# Patient Record
Sex: MALE | Race: WHITE | Employment: OTHER | URBAN - METROPOLITAN AREA
[De-identification: names, ages, dates, MRNs, and addresses within clinical notes are randomized per-mention and may not be internally consistent; named-entity substitution may affect disease eponyms.]

---

## 2017-05-11 ENCOUNTER — HOSPITAL ENCOUNTER (OUTPATIENT)
Dept: GENERAL RADIOLOGY | Age: 82
Discharge: HOME | End: 2017-05-11

## 2017-05-11 LAB
ALBUMIN SERPL-MCNC: 4.7 G/DL (ref 3.4–5)
ALP BLD-CCNC: 107 U/L (ref 40–129)
ALT SERPL-CCNC: 6 U/L (ref 10–40)
ANION GAP SERPL CALCULATED.3IONS-SCNC: 15 MMOL/L (ref 3–16)
AST SERPL-CCNC: 15 U/L (ref 15–37)
BILIRUB SERPL-MCNC: 1 MG/DL (ref 0–1)
BILIRUBIN DIRECT: <0.2 MG/DL (ref 0–0.3)
BILIRUBIN, INDIRECT: ABNORMAL MG/DL (ref 0–1)
BUN BLDV-MCNC: 16 MG/DL (ref 7–20)
CALCIUM SERPL-MCNC: 9.4 MG/DL (ref 8.3–10.6)
CHLORIDE BLD-SCNC: 102 MMOL/L (ref 99–110)
CHOLESTEROL, TOTAL: 134 MG/DL (ref 0–199)
CO2: 26 MMOL/L (ref 21–32)
CREAT SERPL-MCNC: 0.7 MG/DL (ref 0.8–1.3)
GFR AFRICAN AMERICAN: >60
GFR NON-AFRICAN AMERICAN: >60
GLUCOSE BLD-MCNC: 94 MG/DL (ref 70–99)
HDLC SERPL-MCNC: 36 MG/DL (ref 40–60)
LDL CHOLESTEROL CALCULATED: 81 MG/DL
POTASSIUM SERPL-SCNC: 5 MMOL/L (ref 3.5–5.1)
SODIUM BLD-SCNC: 143 MMOL/L (ref 136–145)
TOTAL PROTEIN: 7.5 G/DL (ref 6.4–8.2)
TRIGL SERPL-MCNC: 87 MG/DL (ref 0–150)
VLDLC SERPL CALC-MCNC: 17 MG/DL

## 2020-09-18 ENCOUNTER — APPOINTMENT (OUTPATIENT)
Dept: CT IMAGING | Age: 85
End: 2020-09-18
Payer: MEDICARE

## 2020-09-18 ENCOUNTER — APPOINTMENT (OUTPATIENT)
Dept: GENERAL RADIOLOGY | Age: 85
End: 2020-09-18
Payer: MEDICARE

## 2020-09-18 ENCOUNTER — HOSPITAL ENCOUNTER (EMERGENCY)
Age: 85
Discharge: HOME | End: 2020-09-19
Attending: EMERGENCY MEDICINE
Payer: MEDICARE

## 2020-09-18 VITALS
WEIGHT: 200 LBS | DIASTOLIC BLOOD PRESSURE: 72 MMHG | RESPIRATION RATE: 14 BRPM | BODY MASS INDEX: 28.63 KG/M2 | SYSTOLIC BLOOD PRESSURE: 153 MMHG | TEMPERATURE: 98 F | HEART RATE: 96 BPM | OXYGEN SATURATION: 98 %

## 2020-09-18 PROCEDURE — 70450 CT HEAD/BRAIN W/O DYE: CPT

## 2020-09-18 PROCEDURE — 73130 X-RAY EXAM OF HAND: CPT

## 2020-09-18 PROCEDURE — 99284 EMERGENCY DEPT VISIT MOD MDM: CPT

## 2020-09-19 NOTE — ED PROVIDER NOTES
[facility]  ED  EMERGENCYDEPARTMENT ENCOUNTER      Pt Name: Chris Almaraz  MRN: [id number]  [place] [id number] evaluation: 9/18/2020  Provider:Lam Toth MD    CHIEF COMPLAINT       Chief Complaint   Patient presents with   Tressia Pulse     states fell off tractor a couple hours ago, L wrist deformity and L hip pain, denies LOC, takes plavix          HISTORY OF PRESENT ILLNESS    Chris Almaraz is a 80 y.o. male who presents to the emergency department with fall. Patient was getting out of his tractor, fell to ground landing on his left wrist.  Unsure if he lost consciousness but he remembers his vision coming back slowly. Having left knee pain, left wrist pain. Able to ambulate normally, although he says that he is poor with ambulation in general.  Patient says that he then got on his tractor and continue to work. Pain is moderate in severity, dull, worse on the ulnar side of the wrist.  Nothing taken for pain. Patient is on blood thinners. Nursing Notes were reviewed. REVIEW OF SYSTEMS       Review of Systems    10 point review of systems was performed and was negative exceptas specifically noted in the HPI. PAST MEDICAL HISTORY   History reviewed. No pertinent past medical history. SURGICAL HISTORY       Past Surgical History:   Procedure Laterality Date    ARM SURGERY      CARDIAC SURGERY           CURRENT MEDICATIONS       Previous Medications    AMLODIPINE (NORVASC) 10 MG TABLET    Take 10 mg by mouth daily. ASPIRIN 81 MG TABLET    Take 81 mg by mouth daily. BENAZEPRIL (LOTENSIN) 20 MG TABLET    Take 20 mg by mouth daily. CLOPIDOGREL (PLAVIX) 75 MG TABLET    Take 75 mg by mouth daily. MELOXICAM (MOBIC) 15 MG TABLET    Take 1 tablet by mouth daily    ROSUVASTATIN (CRESTOR) 20 MG TABLET    Take 20 mg by mouth daily. ALLERGIES     Patient has no known allergies. FAMILY HISTORY     History reviewed. No pertinent family history.        SOCIAL HISTORY       Social History     Socioeconomic History    Marital status:      Spouse name: None    Number of children: None    Years of education: None    Highest education level: None   Occupational History    None   Social Needs    Financial resource strain: None    Food insecurity     Worry: None     Inability: None    Transportation needs     Medical: None     Non-medical: None   Tobacco Use    Smoking status: Never Smoker   Substance and Sexual Activity    Alcohol use: None    Drug use: None    Sexual activity: None   Lifestyle    Physical activity     Days per week: None     Minutes per session: None    Stress: None   Relationships    Social connections     Talks on phone: None     Gets together: None     Attends [employer]od[employer] service: None     Active member of club or organization: None     Attends meetings of clubs or organizations: None     Relationship status: None    Intimate partner violence     Fear of current or ex partner: None     Emotionally abused: None     Physically abused: None     Forced sexual activity: None   Other Topics Concern    None   Social History Narrative    None       SCREENINGS    [place] Coma Scale  Eye Opening: Spontaneous  Best Verbal Response: Oriented  Best Motor Response: Obeys commands  [place] Coma Scale Score: 15        PHYSICAL EXAM       ED Triage Vitals   BP Temp Temp Source Pulse Resp SpO2 Height Weight   09/18/20 2033 09/18/20 2034 09/18/20 2034 09/18/20 2033 09/18/20 2033 09/18/20 2033 -- 09/18/20 2033   (!) 159/82 98 °F (36.7 °C) Oral 98 18 97 %  200 lb (90.7 kg)       Physical Exam  General appearance: Alert, cooperative, no distress, appears stated age. Head:  Normocephalic, without obvious abnormality, atraumatic.   HEENT: Mucous membranes moist.  Neck: Full ROM, trachea midline, no JVD  Lungs: No respiratory distress  Cardiovasular: Perfusing extremities  Abdomen: Nontender, no guarding  Extremities: Swelling and tenderness to palpation diffusely of the left wrist.  There is a large associated hematoma most notable on the dorsal surface of the hand extending to the ulna, but there is diffuse tenderness. There is 2+ radial pulse. Patient able to move all fingers. Capillary refill is less than 2 seconds. On the left knee there is a mild swelling but easy range of motion, same with the left hip. Skin: No rashes or lesions to exposed skin  Neurologic: Alert and oriented x3, motor grossly normal, clear speech    DIAGNOSTIC RESULTS     EKG:     IMAGING:   Non-plain film images such as CT, Ultrasound and MRI are read by the radiologist.Plain radiographic images are visualized and preliminarily interpreted by the emergency physician with the below findings:    Ulnar styloid fracture and intra-articular radial fracture. Interpretation per the Radiologist below, if available at the time of this note:    CT HEAD WO CONTRAST   Final Result   No acute intracranial abnormality on this motion limited exam.      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 XR HAND LEFT (MIN 3 VIEWS)   Final Result   Comminuted, intra-articular impacted distal left radial fracture. Ulnar   styloid fracture. EDBEDSIDE ULTRASOUND:   Performed by Thang Dietz - none    LABS:  Labs Reviewed - No data to display    All other labs were within normal range or not returned as of this dictation. EMERGENCY DEPARTMENT COURSE and DIFFERENTIAL DIAGNOSIS/MDM:   Vitals:    Vitals:    09/18/20 2033 09/18/20 2034 09/18/20 2224   BP: (!) 159/82  (!) 153/72   Pulse: 98  96   Resp: 18  14   Temp:  98 °F (36.7 °C)    TempSrc:  Oral    SpO2: 97%  98%   Weight: 200 lb (90.7 kg)         Medications - No data to display    MDM     Patient presents with fall. Vital signs are stable.   Examination shows swelling of the left wrist with large associated hematoma consistent with the patient's history of using

## 2020-09-19 NOTE — ED NOTES
Fall precautions in place for pt. Bed alarm, fall band in place. Pt declines fall socks.      Bryn Ambriz RN  09/18/20 0550

## 2020-09-22 ENCOUNTER — OFFICE VISIT (OUTPATIENT)
Dept: ORTHOPEDIC SURGERY | Age: 85
End: 2020-09-22
Payer: MEDICARE

## 2020-09-22 VITALS — WEIGHT: 200 LBS | BODY MASS INDEX: 28.63 KG/M2 | RESPIRATION RATE: 17 BRPM | HEIGHT: 70 IN

## 2020-09-22 PROBLEM — S52.532A CLOSED COLLES' FRACTURE OF LEFT RADIUS: Status: ACTIVE | Noted: 2020-09-22

## 2020-09-22 PROCEDURE — 4040F PNEUMOC VAC/ADMIN/RCVD: CPT | Performed by: ORTHOPAEDIC SURGERY

## 2020-09-22 PROCEDURE — 1123F ACP DISCUSS/DSCN MKR DOCD: CPT | Performed by: ORTHOPAEDIC SURGERY

## 2020-09-22 PROCEDURE — G8427 DOCREV CUR MEDS BY ELIG CLIN: HCPCS | Performed by: ORTHOPAEDIC SURGERY

## 2020-09-22 PROCEDURE — 99203 OFFICE O/P NEW LOW 30 MIN: CPT | Performed by: ORTHOPAEDIC SURGERY

## 2020-09-22 PROCEDURE — G8417 CALC BMI ABV UP PARAM F/U: HCPCS | Performed by: ORTHOPAEDIC SURGERY

## 2020-09-22 PROCEDURE — 29075 APPL CST ELBW FNGR SHORT ARM: CPT | Performed by: ORTHOPAEDIC SURGERY

## 2020-09-22 PROCEDURE — 4004F PT TOBACCO SCREEN RCVD TLK: CPT | Performed by: ORTHOPAEDIC SURGERY

## 2020-09-22 NOTE — PROGRESS NOTES
Chief Complaint  Wrist Pain (NP LT WRIST FX DOI: 9/18)      History of Present Illness:  Kevin Leyva is a 80 y.o. male. He presents today for a new hand surgery specialty evaluation regarding his left wrist.  I have not seen him in probably 15 years ago when we did internal fixation of his right wrist and forearm. He reports that he was riding his tractor on 9/18/2020 and when he got off the tractor his foot got caught and he landed on an outstretched left wrist.  He did go back and finish his yard but continued to have soreness in the wrist.  Subsequent emergency room visit demonstrated a distal radius fracture with some impaction. Medical History:  Patient's medications, allergies, past medical, surgical, social and family histories were reviewed and updated as appropriate. Review of Systems  Pertinent items are noted in HPI  Denies fever, chills, confusion, bowel/bladder active change. Review of systems reviewed from Patient History Form dated on 9/22/20 and available in the patient's chart under the Media tab. Vital Signs  Vitals:    09/22/20 0754   Resp: 17       General Exam:   Constitutional: Patient is adequately groomed with no evidence of malnutrition  Mental Status: The patient is oriented to time, place and person. The patient's mood and affect are appropriate. Wrist Examination    Inspection: There is some moderate swelling but no sign of open laceration. There may be some fullness over the dorsum of the wrist but the overall resting alignment is satisfactory. Palpation: There is tenderness both when I palpate over the distal radius but also the ulnar styloid. There is no pain at the elbow or into the fingers.     Range of Motion: There is satisfactory flexion and extension of fingers and thumb although there is some finger stiffness in general consistent with his distal edema    Strength: No focal weakness noted    Special Tests: Hand and forearm compartments remain soft    Skin: There are no additional worrisome rashes, ulcerations or lesions. Sensation: normal    Circulation normal    Additional Comments:     Additional Examinations:  Right Upper Extremity:  Examination of the right upper extremity does not show any tenderness, deformity or injury. Range of motion is unremarkable. There is no gross instability. There are no rashes, ulcerations or lesions. Strength and tone are normal.    Radiology:     X-rays obtained at the emergency room and reviewed in office:  Views 3  Location left wrist  Impression x-rays demonstrate left distal radius fracture with some comminution and slight intra-articular extension, with slight shortening and dorsal angulation. There is also a minimally displaced ulnar styloid fracture    Diagnostic Test Findings:        Assessment: Left distal radius fracture    Impression:  Encounter Diagnosis   Name Primary?  Closed Colles' fracture of left radius, initial encounter        Office Procedures:  Orders Placed This Encounter   Procedures    MS CAST SUP SHT ARM ADULT FBRGL    MS APPLY FOREARM CAST       Treatment Plan: I discussed with the patient and his spouse that this fracture needs to be immobilized but I do believe that we can manage him with conservative care. Even with some impaction and dorsal angulation I expect him to have an excellent long-term functional outcome. He does understand he may have some residual rounding of the wrist.    Today we will place him into a well molded short arm fiberglass cast but with some slight palmar flexion to account for the impaction and dorsal angulation. He will work diligently on range of motion of fingers and thumb. I would like to see him back in just 1 week's time to check the repeat x-rays and ensure that we can continue to manage him with conservative care for a minimum immobilization of 6 weeks. Please note that this transcription was created using voice recognition software. Any errors are unintentional and may be due to voice recognition transcription.

## 2020-09-29 ENCOUNTER — OFFICE VISIT (OUTPATIENT)
Dept: ORTHOPEDIC SURGERY | Age: 85
End: 2020-09-29
Payer: MEDICARE

## 2020-09-29 VITALS — BODY MASS INDEX: 28.63 KG/M2 | WEIGHT: 200 LBS | HEIGHT: 70 IN

## 2020-09-29 PROCEDURE — 1123F ACP DISCUSS/DSCN MKR DOCD: CPT | Performed by: ORTHOPAEDIC SURGERY

## 2020-09-29 PROCEDURE — 4040F PNEUMOC VAC/ADMIN/RCVD: CPT | Performed by: ORTHOPAEDIC SURGERY

## 2020-09-29 PROCEDURE — 99213 OFFICE O/P EST LOW 20 MIN: CPT | Performed by: ORTHOPAEDIC SURGERY

## 2020-09-29 PROCEDURE — G8427 DOCREV CUR MEDS BY ELIG CLIN: HCPCS | Performed by: ORTHOPAEDIC SURGERY

## 2020-09-29 PROCEDURE — G8417 CALC BMI ABV UP PARAM F/U: HCPCS | Performed by: ORTHOPAEDIC SURGERY

## 2020-09-29 PROCEDURE — 1036F TOBACCO NON-USER: CPT | Performed by: ORTHOPAEDIC SURGERY

## 2020-09-29 NOTE — PROGRESS NOTES
Chief Complaint:  Wrist Pain (LT WRIST FX DOI: 9/18)      History of Present of Illness: The patient returns for close follow-up and reports that he has been working on simple range of motion of the fingers without any other perceived setback. He does feel some generalized stiffness into the thumb and digits. Review of Systems  Pertinent items are noted in HPI  Denies fever, chills, confusion, bowel/bladder active change. Review of systems reviewed from Patient History Form dated on 9/22/2020 and available in the patient's chart under the Media tab. Examination:  On exam today the cast remains in good position and is fitting well. There is no excessive looseness or tightness. He does demonstrate good flexion and extension with subjective stiffness. There is no sign of any loss of perfusion or sensation. Radiology:     X-rays obtained and reviewed in office:  Views 3  Location left wrist  Impression x-rays demonstrate overall maintained position of the slightly impacted left distal radius fracture which does have approximately 3 to 4 degrees of dorsal tilt on lateral view. There is no dramatic change from prior x-rays. Orders Placed This Encounter   Procedures    XR WRIST LEFT (MIN 3 VIEWS)     Standing Status:   Future     Number of Occurrences:   1     Standing Expiration Date:   9/23/2021       Impression:  Encounter Diagnosis   Name Primary?  Closed Colles' fracture of left radius with routine healing, subsequent encounter Yes         Treatment Plan:  I believe we can continue to treat him with conservative cast immobilization.   I would like to see him back in just another week's time as I do believe at that juncture we will plan to take new x-rays but may need to consider changing his cast if there is further reduction of swelling that will necessitate a new well molded fitted cast.  They do understand I will recommend 6 full weeks of immobilization before transitioning to a removable

## 2020-10-08 ENCOUNTER — OFFICE VISIT (OUTPATIENT)
Dept: ORTHOPEDIC SURGERY | Age: 85
End: 2020-10-08
Payer: MEDICARE

## 2020-10-08 VITALS — WEIGHT: 200 LBS | HEIGHT: 70 IN | BODY MASS INDEX: 28.63 KG/M2

## 2020-10-08 PROCEDURE — G8484 FLU IMMUNIZE NO ADMIN: HCPCS | Performed by: ORTHOPAEDIC SURGERY

## 2020-10-08 PROCEDURE — 99213 OFFICE O/P EST LOW 20 MIN: CPT | Performed by: ORTHOPAEDIC SURGERY

## 2020-10-08 PROCEDURE — G8417 CALC BMI ABV UP PARAM F/U: HCPCS | Performed by: ORTHOPAEDIC SURGERY

## 2020-10-08 PROCEDURE — 4040F PNEUMOC VAC/ADMIN/RCVD: CPT | Performed by: ORTHOPAEDIC SURGERY

## 2020-10-08 PROCEDURE — 1123F ACP DISCUSS/DSCN MKR DOCD: CPT | Performed by: ORTHOPAEDIC SURGERY

## 2020-10-08 PROCEDURE — G8427 DOCREV CUR MEDS BY ELIG CLIN: HCPCS | Performed by: ORTHOPAEDIC SURGERY

## 2020-10-08 PROCEDURE — 1036F TOBACCO NON-USER: CPT | Performed by: ORTHOPAEDIC SURGERY

## 2020-10-08 NOTE — PROGRESS NOTES
Chief Complaint:  Wrist Injury (LT WRIST FX DOI: 9/18)      History of Present of Illness: The patient returns for close follow-up and reports that the cast is started to give him some discomfort near the distal ulna. He believes that the swelling may have come down since her last visit. He is now about 3 weeks and from the initial injury with his distal radius fracture. Review of Systems  Pertinent items are noted in HPI  Denies fever, chills, confusion, bowel/bladder active change. Review of systems reviewed from Patient History Form dated on 9/22/2020 and available in the patient's chart under the Media tab. Examination:  On exam today indeed the cast is becoming slightly loose. He describes discomfort over the distal ulna region. He has satisfactory range of motion of fingers and thumb with good perfusion and good sensation. Radiology:     X-rays obtained and reviewed in office:  Views 3  Location left wrist  Impression x-rays demonstrates overall satisfactory alignment with respect to the radius on the AP view, but on the lateral view there is still very slight dorsal tilt between 5 and 10 degrees. There is a mild amount of dorsal comminution noted as well on the lateral view. Orders Placed This Encounter   Procedures    XR WRIST LEFT (MIN 3 VIEWS)     Standing Status:   Future     Number of Occurrences:   1     Standing Expiration Date:   10/8/2021       Impression:  Encounter Diagnosis   Name Primary?  Closed Colles' fracture of left radius with routine healing, subsequent encounter Yes         Treatment Plan:  I believe the patient would be best treated to have his cast removed and have a new well molded fitting cast in slight flexion. I would like him to continue this cast for the final 3 weeks of healing.     I have been very upfront with him and his family about the fact that he has a slight dorsal angulation, yet I believe that with continued conservative care we can expect an excellent highly functioning long-term outcome without the need of surgical intervention. They do understand he may have some slight rounding or prominence at the wrist.    We will place a new cast today and I will see him in 3 weeks unless there is an earlier problem, and at that visit we will plan for cast removal, x-rays, and likely advancement to a removable brace. Please note that this transcription was created using voice recognition software. Any errors are unintentional and may be due to voice recognition transcription.

## 2020-10-30 ENCOUNTER — OFFICE VISIT (OUTPATIENT)
Dept: ORTHOPEDIC SURGERY | Age: 85
End: 2020-10-30
Payer: MEDICARE

## 2020-10-30 VITALS — RESPIRATION RATE: 18 BRPM | WEIGHT: 200 LBS | BODY MASS INDEX: 28.63 KG/M2 | HEIGHT: 70 IN

## 2020-10-30 PROCEDURE — 1036F TOBACCO NON-USER: CPT | Performed by: ORTHOPAEDIC SURGERY

## 2020-10-30 PROCEDURE — 1123F ACP DISCUSS/DSCN MKR DOCD: CPT | Performed by: ORTHOPAEDIC SURGERY

## 2020-10-30 PROCEDURE — 99213 OFFICE O/P EST LOW 20 MIN: CPT | Performed by: ORTHOPAEDIC SURGERY

## 2020-10-30 PROCEDURE — G8417 CALC BMI ABV UP PARAM F/U: HCPCS | Performed by: ORTHOPAEDIC SURGERY

## 2020-10-30 PROCEDURE — 4040F PNEUMOC VAC/ADMIN/RCVD: CPT | Performed by: ORTHOPAEDIC SURGERY

## 2020-10-30 PROCEDURE — G8484 FLU IMMUNIZE NO ADMIN: HCPCS | Performed by: ORTHOPAEDIC SURGERY

## 2020-10-30 PROCEDURE — G8428 CUR MEDS NOT DOCUMENT: HCPCS | Performed by: ORTHOPAEDIC SURGERY

## 2020-10-30 NOTE — PROGRESS NOTES
Chief Complaint:  Follow-up (CK LEFT WRIST : CAST OFF NEW XR)      History of Present of Illness: The patient returns for close follow-up of his left distal radius fracture. Today we have remove the cast and he does report some mild stiffness. Review of Systems  Pertinent items are noted in HPI  Denies fever, chills, confusion, bowel/bladder active change. Review of systems reviewed from Patient History Form dated on 9/22/2020 and available in the patient's chart under the Media tab. Examination:  On exam today his fingers remain perfused and sensate with satisfactory flexion and extension. I can take him through a gentle arc of motion at the wrist without any mechanical obstruction. There is minimal tenderness over the distal radius. Radiology:     X-rays obtained and reviewed in office:  Views 3  Location left wrist  Impression x-rays today demonstrate further healing across the fracture with no change in the very slight dorsal tilt that we noticed previously. There is an overall satisfactory maintained alignment. Orders Placed This Encounter   Procedures    XR WRIST LEFT (MIN 3 VIEWS)     Order Specific Question:   Reason for exam:     Answer:   PAIN       Impression:  Encounter Diagnoses   Name Primary?  Left wrist pain Yes    Closed Colles' fracture of left radius with routine healing, subsequent encounter          Treatment Plan: Today he has a brace that still fits well and we will have him advance back to the brace but remove it for gentle daily range of motion and stretching exercises. We talked through advancing his activities as comfort allows. We will see him back one more time in 3 weeks with follow-up x-rays. If at that juncture he is struggling with his own function we would then have the opportunity to consider formalized therapy. Please note that this transcription was created using voice recognition software.   Any errors are unintentional and may be due to voice recognition transcription.

## 2020-11-23 ENCOUNTER — OFFICE VISIT (OUTPATIENT)
Dept: ORTHOPEDIC SURGERY | Age: 85
End: 2020-11-23
Payer: MEDICARE

## 2020-11-23 VITALS — RESPIRATION RATE: 17 BRPM | HEIGHT: 70 IN | WEIGHT: 200 LBS | BODY MASS INDEX: 28.63 KG/M2

## 2020-11-23 PROCEDURE — 1123F ACP DISCUSS/DSCN MKR DOCD: CPT | Performed by: ORTHOPAEDIC SURGERY

## 2020-11-23 PROCEDURE — 1036F TOBACCO NON-USER: CPT | Performed by: ORTHOPAEDIC SURGERY

## 2020-11-23 PROCEDURE — G8417 CALC BMI ABV UP PARAM F/U: HCPCS | Performed by: ORTHOPAEDIC SURGERY

## 2020-11-23 PROCEDURE — 4040F PNEUMOC VAC/ADMIN/RCVD: CPT | Performed by: ORTHOPAEDIC SURGERY

## 2020-11-23 PROCEDURE — 99213 OFFICE O/P EST LOW 20 MIN: CPT | Performed by: ORTHOPAEDIC SURGERY

## 2020-11-23 PROCEDURE — G8427 DOCREV CUR MEDS BY ELIG CLIN: HCPCS | Performed by: ORTHOPAEDIC SURGERY

## 2020-11-23 PROCEDURE — G8484 FLU IMMUNIZE NO ADMIN: HCPCS | Performed by: ORTHOPAEDIC SURGERY

## 2021-01-28 ENCOUNTER — IMMUNIZATION (OUTPATIENT)
Dept: PRIMARY CARE CLINIC | Age: 86
End: 2021-01-28
Payer: MEDICARE

## 2021-01-28 PROCEDURE — 0001A PR IMM ADMN SARSCOV2 30MCG/0.3ML DIL RECON 1ST DOSE: CPT | Performed by: FAMILY MEDICINE

## 2021-01-28 PROCEDURE — 91300 COVID-19, PFIZER VACCINE 30MCG/0.3ML DOSE: CPT | Performed by: FAMILY MEDICINE

## 2021-02-18 ENCOUNTER — IMMUNIZATION (OUTPATIENT)
Dept: PRIMARY CARE CLINIC | Age: 86
End: 2021-02-18
Payer: MEDICARE

## 2021-02-18 PROCEDURE — 0002A COVID-19, PFIZER VACCINE 30MCG/0.3ML DOSE: CPT | Performed by: FAMILY MEDICINE

## 2021-02-18 PROCEDURE — 91300 COVID-19, PFIZER VACCINE 30MCG/0.3ML DOSE: CPT | Performed by: FAMILY MEDICINE

## 2021-08-12 ENCOUNTER — HOSPITAL ENCOUNTER (OUTPATIENT)
Dept: GENERAL RADIOLOGY | Age: 86
Discharge: HOME | End: 2021-08-12
Payer: MEDICARE

## 2021-08-12 DIAGNOSIS — R06.02 SHORTNESS OF BREATH: ICD-10-CM

## 2021-08-12 PROCEDURE — 71046 X-RAY EXAM CHEST 2 VIEWS: CPT

## 2025-08-22 ENCOUNTER — HOSPITAL ENCOUNTER (OUTPATIENT)
Dept: GENERAL RADIOLOGY | Age: 89
Discharge: HOME | End: 2025-08-22
Payer: MEDICARE

## 2025-08-22 DIAGNOSIS — R60.9 EDEMA, UNSPECIFIED TYPE: ICD-10-CM

## 2025-08-22 PROCEDURE — 71045 X-RAY EXAM CHEST 1 VIEW: CPT
